# Patient Record
Sex: FEMALE | URBAN - METROPOLITAN AREA
[De-identification: names, ages, dates, MRNs, and addresses within clinical notes are randomized per-mention and may not be internally consistent; named-entity substitution may affect disease eponyms.]

---

## 2017-06-30 ENCOUNTER — HOSPITAL ENCOUNTER (EMERGENCY)
Facility: HOSPITAL | Age: 57
Discharge: HOME | End: 2017-06-30
Attending: EMERGENCY MEDICINE | Admitting: EMERGENCY MEDICINE
Payer: COMMERCIAL

## 2017-06-30 VITALS
TEMPERATURE: 97.6 F | BODY MASS INDEX: 28.17 KG/M2 | HEIGHT: 64 IN | RESPIRATION RATE: 18 BRPM | OXYGEN SATURATION: 96 % | SYSTOLIC BLOOD PRESSURE: 158 MMHG | WEIGHT: 165 LBS | HEART RATE: 69 BPM | DIASTOLIC BLOOD PRESSURE: 77 MMHG

## 2017-06-30 DIAGNOSIS — K92.1 HEMATOCHEZIA: Primary | ICD-10-CM

## 2017-06-30 PROCEDURE — 99284 EMERGENCY DEPT VISIT MOD MDM: CPT

## 2017-06-30 PROCEDURE — 82272 OCCULT BLD FECES 1-3 TESTS: CPT

## 2017-06-30 RX ORDER — METHYLPREDNISOLONE 4 MG
2-3 TABLET, DOSE PACK ORAL DAILY
COMMUNITY

## 2017-06-30 RX ORDER — LIOTHYRONINE SODIUM 5 UG/1
7.5 TABLET ORAL 2 TIMES DAILY
COMMUNITY

## 2017-06-30 RX ORDER — TRYPTOPHAN 500 MG
1-2 CAPSULE ORAL 3 TIMES DAILY
COMMUNITY

## 2023-09-14 ENCOUNTER — OFFICE VISIT (OUTPATIENT)
Dept: AUDIOLOGY | Facility: CLINIC | Age: 63
End: 2023-09-14
Payer: COMMERCIAL

## 2023-09-14 DIAGNOSIS — R42 DIZZINESS: Primary | ICD-10-CM

## 2023-09-14 PROCEDURE — 92537 CALORIC VSTBLR TEST W/REC: CPT | Performed by: AUDIOLOGIST

## 2023-09-14 PROCEDURE — 92540 BASIC VESTIBULAR EVALUATION: CPT | Performed by: AUDIOLOGIST

## 2023-10-02 ENCOUNTER — OFFICE VISIT (OUTPATIENT)
Dept: AUDIOLOGY | Facility: CLINIC | Age: 63
End: 2023-10-02
Payer: COMMERCIAL

## 2023-10-02 DIAGNOSIS — R42 DIZZINESS: Primary | ICD-10-CM

## 2023-10-02 PROCEDURE — 92519 VEMP TST I&R CERVICAL&OCULAR: CPT | Performed by: AUDIOLOGIST

## 2023-10-02 NOTE — PROGRESS NOTES
VESTIBULAR EVALUATION - [address] AUDIOLOGY    Patient Name: Claudia Quevedo   MRN:  [id number]   :  1960   Age: 66 y.o. Gender: female   DOS: 10/2/2023     HISTORY:     Claudia Quevedo, a 66 y.o. female, was seen on 10/2/2023 at the referral of Dr. Neris Fabian for a VNG study due to complaints of recurrent dizziness. Ms. Alice Pacheco was unaccompanied to today's visit. Ms. Alice Pacheco was last seen in our clinic on 23 for a vng study, which revealed normal findings. Today, Ms. Alice Pacheco reported that onset of symptoms are continued without relief. She continues to feel the brain fog and cognitive issues that were detailed at her last visit. Interim medical history was otherwise unremarkable. cVEMP RESULTS: Replicable Z1/B8 waveforms obtained at 70/100 dBnHL rarefaction using 500 Hz tone bursts at 5.1/sec. Right:   WNL; Absolute latencies WNL and P1/N1 waveforms absent at 70 dBnHL. Left: WNL; Absolute latencies WNL and P1/N1 waveforms absent at 70 dBnHL  Interamplitude Symmetry: Normal (32%, with a stronger left cVEMP response). Of note, this is just inside accepted clinical tolerance (33%)     Normal R/L ratio tolerance: 33%  Latency norms: P1 mean 16.9s, 2 SD 3.37      N1 mean 25.24s, 2 SD 4.07    cVEMP IMPRESSIONS:    • Normal bilateral cVEMP study, consistent with normal saccular functioning and vestibulo-collic reflex pathways. Of note, Ms. Alice Pahceco had considerable difficulty with the head raising procedure required in cVEMP testing due to limited cervical ROM and persistent tremors. This introduced some noise throughout recording. oVEMP RESULTS: Replicable C5/Y4 waveforms obtained at 70/100 dBnHL condensation using 500 Hz tone bursts at 5.1/sec. Right:   WNL; Absolute latencies WNL and P1/N1 waveforms absent at 70 dBnHL. Left: WNL;  Absolute latencies WNL and P1/N1 waveforms absent at 70 dBnHL  Interamplitude Symmetry: Normal (32%, with a stronger cVEMP response)    Normal R/L ratio tolerance: 33%  Latency norms: P1 mean 12.8s, 2 SD 2.47      N1 mean 15.9s, 2 SD 3.02    Poor SNR found for amplitudes AU, poorer re: right vs. Left. Interpretation with caution is advised. oVEMP IMPRESSIONS:    Normal bilateral oVEMP study, consistent with normal utricular functioning and crossed vestibulo-ocular reflex pathways. Normal (32%, with a stronger left cVEMP response). Of note, this is just inside accepted clinical tolerance (33%). RECOMMENDATIONS:  1.) Follow-up with referring provider to review today's results. Replication of borderline results are of concern for possible right asymmetry re: left. Taken together, vestibular therapy with a focus on central compensation and developing appropriate compensatory strategies may be considered. Findings altogether do not suggest an apparent otologic involvement or site of lesion. 2.) Fall precautions after vestibular system stimulation were discussed at length with the patient. Though most test effects are expected to subside within minutes, it was recommended that they allot extra time for ambulation, avoid rigorous activities, use handrails when available, and be cautious of poorly lit areas for the rest of the day. 3.) Continue to monitor dizziness symptoms. If symptoms worsen or fail to improve prior to follow-up with their referring provider, urgent medical attention should be considered. 4.) Ms. Joana Harp was reminded to be mindful of general wellness when considering dizziness etiologies, including: eating a healthy/balanced diet, proper hydration, stress reduction, proper sleep hygiene, maintenance of an exercise regimen, etc. They were encouraged to follow-up with their primary care physician for further guidance on these topics. *caloric results attached in media (if completed), as well as any other abnormal findings. *    Today's results were counseled at length with Ms. Joana Harp and all of her questions were addressed.      It was a pleasure working with Ms. Alice Pacheco today. Thank you for referring this patient. Benjamin Rdz.   Audiology Coordinator, Clinical Audiologist    [address] [phone]

## 2024-05-12 ENCOUNTER — HOSPITAL ENCOUNTER (EMERGENCY)
Facility: HOSPITAL | Age: 64
Discharge: HOME | End: 2024-05-12
Attending: EMERGENCY MEDICINE | Admitting: EMERGENCY MEDICINE
Payer: COMMERCIAL

## 2024-05-12 ENCOUNTER — APPOINTMENT (EMERGENCY)
Dept: RADIOLOGY | Facility: HOSPITAL | Age: 64
End: 2024-05-12
Payer: COMMERCIAL

## 2024-05-12 VITALS
SYSTOLIC BLOOD PRESSURE: 133 MMHG | OXYGEN SATURATION: 97 % | HEART RATE: 63 BPM | DIASTOLIC BLOOD PRESSURE: 69 MMHG | RESPIRATION RATE: 22 BRPM | TEMPERATURE: 97.2 F

## 2024-05-12 DIAGNOSIS — E87.1 HYPONATREMIA: ICD-10-CM

## 2024-05-12 DIAGNOSIS — R07.9 CHEST PAIN, UNSPECIFIED: Primary | ICD-10-CM

## 2024-05-12 LAB
2HR DELTA HS TROPONIN: 0 NG/L
ALBUMIN SERPL BCP-MCNC: 4.2 G/DL (ref 3.5–5)
ALP SERPL-CCNC: 57 U/L (ref 34–104)
ALT SERPL W P-5'-P-CCNC: 12 U/L (ref 7–52)
ANION GAP SERPL CALCULATED.3IONS-SCNC: 7 MMOL/L (ref 4–13)
AST SERPL W P-5'-P-CCNC: 17 U/L (ref 13–39)
BASOPHILS # BLD AUTO: 0.03 THOUSANDS/ÂΜL (ref 0–0.1)
BASOPHILS NFR BLD AUTO: 1 % (ref 0–1)
BILIRUB SERPL-MCNC: 0.57 MG/DL (ref 0.2–1)
BUN SERPL-MCNC: 12 MG/DL (ref 5–25)
CALCIUM SERPL-MCNC: 8.4 MG/DL (ref 8.4–10.2)
CARDIAC TROPONIN I PNL SERPL HS: 3 NG/L
CARDIAC TROPONIN I PNL SERPL HS: 3 NG/L
CHLORIDE SERPL-SCNC: 95 MMOL/L (ref 96–108)
CO2 SERPL-SCNC: 27 MMOL/L (ref 21–32)
CREAT SERPL-MCNC: 0.66 MG/DL (ref 0.6–1.3)
D DIMER PPP FEU-MCNC: <0.27 UG/ML FEU
EOSINOPHIL # BLD AUTO: 0.06 THOUSAND/ÂΜL (ref 0–0.61)
EOSINOPHIL NFR BLD AUTO: 1 % (ref 0–6)
ERYTHROCYTE [DISTWIDTH] IN BLOOD BY AUTOMATED COUNT: 13.4 % (ref 11.6–15.1)
GFR SERPL CREATININE-BSD FRML MDRD: 95 ML/MIN/1.73SQ M
GLUCOSE SERPL-MCNC: 97 MG/DL (ref 65–140)
HCT VFR BLD AUTO: 38.7 % (ref 34.8–46.1)
HGB BLD-MCNC: 13.3 G/DL (ref 11.5–15.4)
IMM GRANULOCYTES # BLD AUTO: 0.01 THOUSAND/UL (ref 0–0.2)
IMM GRANULOCYTES NFR BLD AUTO: 0 % (ref 0–2)
LYMPHOCYTES # BLD AUTO: 1.31 THOUSANDS/ÂΜL (ref 0.6–4.47)
LYMPHOCYTES NFR BLD AUTO: 24 % (ref 14–44)
MCH RBC QN AUTO: 29.5 PG (ref 26.8–34.3)
MCHC RBC AUTO-ENTMCNC: 34.4 G/DL (ref 31.4–37.4)
MCV RBC AUTO: 86 FL (ref 82–98)
MONOCYTES # BLD AUTO: 0.38 THOUSAND/ÂΜL (ref 0.17–1.22)
MONOCYTES NFR BLD AUTO: 7 % (ref 4–12)
NEUTROPHILS # BLD AUTO: 3.72 THOUSANDS/ÂΜL (ref 1.85–7.62)
NEUTS SEG NFR BLD AUTO: 67 % (ref 43–75)
NRBC BLD AUTO-RTO: 0 /100 WBCS
PLATELET # BLD AUTO: 269 THOUSANDS/UL (ref 149–390)
PMV BLD AUTO: 9 FL (ref 8.9–12.7)
POTASSIUM SERPL-SCNC: 3.6 MMOL/L (ref 3.5–5.3)
PROT SERPL-MCNC: 6.8 G/DL (ref 6.4–8.4)
RBC # BLD AUTO: 4.51 MILLION/UL (ref 3.81–5.12)
SODIUM SERPL-SCNC: 129 MMOL/L (ref 135–147)
WBC # BLD AUTO: 5.51 THOUSAND/UL (ref 4.31–10.16)

## 2024-05-12 PROCEDURE — 85025 COMPLETE CBC W/AUTO DIFF WBC: CPT | Performed by: PHYSICIAN ASSISTANT

## 2024-05-12 PROCEDURE — 85379 FIBRIN DEGRADATION QUANT: CPT | Performed by: PHYSICIAN ASSISTANT

## 2024-05-12 PROCEDURE — 99285 EMERGENCY DEPT VISIT HI MDM: CPT

## 2024-05-12 PROCEDURE — 96360 HYDRATION IV INFUSION INIT: CPT

## 2024-05-12 PROCEDURE — 93005 ELECTROCARDIOGRAM TRACING: CPT

## 2024-05-12 PROCEDURE — 99285 EMERGENCY DEPT VISIT HI MDM: CPT | Performed by: PHYSICIAN ASSISTANT

## 2024-05-12 PROCEDURE — 80053 COMPREHEN METABOLIC PANEL: CPT | Performed by: PHYSICIAN ASSISTANT

## 2024-05-12 PROCEDURE — 71045 X-RAY EXAM CHEST 1 VIEW: CPT

## 2024-05-12 PROCEDURE — 84484 ASSAY OF TROPONIN QUANT: CPT | Performed by: PHYSICIAN ASSISTANT

## 2024-05-12 PROCEDURE — 36415 COLL VENOUS BLD VENIPUNCTURE: CPT | Performed by: PHYSICIAN ASSISTANT

## 2024-05-12 RX ADMIN — SODIUM CHLORIDE 1000 ML: 0.9 INJECTION, SOLUTION INTRAVENOUS at 12:04

## 2024-05-12 NOTE — DISCHARGE INSTRUCTIONS
Follow up with your primary care provider - Ash Wall and your cardiologist out of [place] this week. Call tomorrow for appointment    Encourage fluids     Return to ED for worsening chest pain, shortness of breath, worsening symptoms

## 2024-05-12 NOTE — ED PROVIDER NOTES
"History  Chief Complaint   Patient presents with    Dizziness    Chest Pain     Pt reports of chest tightness, feels like \" my heart is racing \" since Friday     Patient is a 64 yo wf who reports 1 day history of sob, chest tightness, nausea, chills. No provocative or palliative factors. States increased stress recently regarding black mold in her residence. Denies smoking, illicit drug use, alcohol use. Started on pristiq 3 days ago but has already discontinued. No other complaints         Prior to Admission Medications   Prescriptions Last Dose Informant Patient Reported? Taking?   Cyanocobalamin (VITAMIN B 12 PO)   Yes No   Sig: Take 1 tablet by mouth daily   Glucosamine Sulfate 500 MG TABS   Yes No   Sig: Take 2-3 tablets by mouth daily   NON FORMULARY   Yes No   Sig: Take 1-2 capsules by mouth daily Pro epa   NON FORMULARY   Yes No   Sig: Take 1 tablet by mouth 2 (two) times a day coleus   Tryptophan 500 MG CAPS   Yes No   Sig: Take 1-2 capsules by mouth 3 (three) times a day   liothyronine (CYTOMEL) 5 mcg tablet   Yes No   Sig: Take 7.5 mcg by mouth 2 (two) times a day      Facility-Administered Medications: None       Past Medical History:   Diagnosis Date    Disease of thyroid gland     Niall's Temperature Syndrome    History of eye surgery        No past surgical history on file.    No family history on file.  I have reviewed and agree with the history as documented.    E-Cigarette/Vaping     E-Cigarette/Vaping Substances     Social History     Tobacco Use    Smoking status: Never   Substance Use Topics    Alcohol use: Yes    Drug use: No       Review of Systems   Constitutional:  Negative for fever.   Respiratory:  Positive for shortness of breath. Negative for cough.    Cardiovascular:  Negative for chest pain, palpitations and leg swelling.   Gastrointestinal:  Positive for nausea. Negative for abdominal pain and vomiting.   Genitourinary:  Negative for flank pain.   Neurological:  Negative for " headaches.       Physical Exam  Physical Exam  Vitals and nursing note reviewed.   Constitutional:       General: She is not in acute distress.     Appearance: She is well-developed. She is not ill-appearing, toxic-appearing or diaphoretic.   HENT:      Head: Normocephalic and atraumatic.   Eyes:      Extraocular Movements: Extraocular movements intact.      Pupils: Pupils are equal, round, and reactive to light.   Cardiovascular:      Rate and Rhythm: Normal rate and regular rhythm.      Heart sounds: Normal heart sounds.   Pulmonary:      Effort: Pulmonary effort is normal.      Breath sounds: Normal breath sounds.   Abdominal:      General: Bowel sounds are normal.      Palpations: Abdomen is soft.   Musculoskeletal:         General: Normal range of motion.      Cervical back: Normal range of motion and neck supple.   Skin:     General: Skin is warm and dry.      Capillary Refill: Capillary refill takes less than 2 seconds.   Neurological:      General: No focal deficit present.      Mental Status: She is alert and oriented to person, place, and time.   Psychiatric:         Mood and Affect: Mood normal.         Vital Signs  ED Triage Vitals [05/12/24 1040]   Temperature Pulse Respirations Blood Pressure SpO2   (!) 97.2 °F (36.2 °C) 72 20 129/73 97 %      Temp Source Heart Rate Source Patient Position - Orthostatic VS BP Location FiO2 (%)   Tympanic Monitor -- -- --      Pain Score       --           Vitals:    05/12/24 1130 05/12/24 1200 05/12/24 1230 05/12/24 1400   BP: 132/74 135/70 131/65 133/69   Pulse: 62 60 59 63         Visual Acuity      ED Medications  Medications   sodium chloride 0.9 % bolus 1,000 mL (0 mL Intravenous Stopped 5/12/24 1255)       Diagnostic Studies  Results Reviewed       Procedure Component Value Units Date/Time    HS Troponin I 2hr [[id number]]  (Normal) Collected: 05/12/24 1306    Lab Status: Final result Specimen: Blood from Arm, Right Updated: 05/12/24 1338     hs TnI 2hr 3 ng/L       Delta 2hr hsTnI 0 ng/L     HS Troponin I 4hr [[id number]]     Lab Status: No result Specimen: Blood     D-Dimer [[id number]]  (Normal) Collected: 05/12/24 1108    Lab Status: Final result Specimen: Blood from Arm, Left Updated: 05/12/24 1140     D-Dimer, Quant <0.27 ug/ml FEU     Narrative:      In the evaluation for possible pulmonary embolism, in the appropriate (Well's Score of 4 or less) patient, the age adjusted d-dimer cutoff for this patient can be calculated as:    Age x 0.01 (in ug/mL) for Age-adjusted D-dimer exclusion threshold for a patient over 50 years.    Comprehensive metabolic panel [[id number]]  (Abnormal) Collected: 05/12/24 1108    Lab Status: Final result Specimen: Blood from Arm, Left Updated: 05/12/24 1137     Sodium 129 mmol/L      Potassium 3.6 mmol/L      Chloride 95 mmol/L      CO2 27 mmol/L      ANION GAP 7 mmol/L      BUN 12 mg/dL      Creatinine 0.66 mg/dL      Glucose 97 mg/dL      Calcium 8.4 mg/dL      AST 17 U/L      ALT 12 U/L      Alkaline Phosphatase 57 U/L      Total Protein 6.8 g/dL      Albumin 4.2 g/dL      Total Bilirubin 0.57 mg/dL      eGFR 95 ml/min/1.73sq m     Narrative:      National Kid[employer] Disease Foundation guidelines for Chronic Kidney Disease (CKD):     Stage 1 with normal or high GFR (GFR > 90 mL/min/1.73 square meters)    Stage 2 Mild CKD (GFR = 60-89 mL/min/1.73 square meters)    Stage 3A Moderate CKD (GFR = 45-59 mL/min/1.73 square meters)    Stage 3B Moderate CKD (GFR = 30-44 mL/min/1.73 square meters)    Stage 4 Severe CKD (GFR = 15-29 mL/min/1.73 square meters)    Stage 5 End Stage CKD (GFR <15 mL/min/1.73 square meters)  Note: GFR calculation is accurate only with a steady state creatinine    HS Troponin 0hr (reflex protocol) [[id number]]  (Normal) Collected: 05/12/24 1108    Lab Status: Final result Specimen: Blood from Arm, Left Updated: 05/12/24 1137     hs TnI 0hr 3 ng/L     CBC and differential [[id number]] Collected: 05/12/24 110    Lab Status: Final result  Specimen: Blood from Arm, Left Updated: 05/12/24 1114     WBC 5.51 Thousand/uL      RBC 4.51 Million/uL      Hemoglobin 13.3 g/dL      Hematocrit 38.7 %      MCV 86 fL      MCH 29.5 pg      MCHC 34.4 g/dL      RDW 13.4 %      MPV 9.0 fL      Platelets 269 Thousands/uL      nRBC 0 /100 WBCs      Segmented % 67 %      Immature Grans % 0 %      Lymphocytes % 24 %      Monocytes % 7 %      Eosinophils Relative 1 %      Basophils Relative 1 %      Absolute Neutrophils 3.72 Thousands/µL      Absolute Immature Grans 0.01 Thousand/uL      Absolute Lymphocytes 1.31 Thousands/µL      Absolute Monocytes 0.38 Thousand/µL      Eosinophils Absolute 0.06 Thousand/µL      Basophils Absolute 0.03 Thousands/µL                    XR chest 1 view portable   Final Result by Soni Ramirez MD (05/12 1223)      No acute cardiopulmonary disease.            Workstation performed: [id number]                    Procedures  ECG 12 Lead Documentation Only    Date/Time: 5/12/2024 4:05 PM    Performed by: Alfonso Li PA-C  Authorized by: Alfonso Li PA-C    Indications / Diagnosis:  Chest tightness  ECG reviewed by me, the ED Provider: yes    Patient location:  ED  Rate:     ECG rate:  69    ECG rate assessment: normal    Rhythm:     Rhythm: sinus rhythm    Ectopy:     Ectopy: none    QRS:     QRS axis:  Normal    QRS intervals:  Normal  Conduction:     Conduction: normal    ST segments:     ST segments:  Normal  T waves:     T waves: normal             ED Course             HEART Risk Score      Flowsheet Row Most Recent Value   Heart Score Risk Calculator    History 0 Filed at: 05/12/2024 1419   ECG 0 Filed at: 05/12/2024 1419   Age 1 Filed at: 05/12/2024 1419   Risk Factors 1 Filed at: 05/12/2024 1419   Troponin 0 Filed at: 05/12/2024 1419   HEART Score 2 Filed at: 05/12/2024 1419                                        Medical Decision Making  I have considered ACS, arrhythmia, PE, pneumonia, pneumothorax on my  differential diagnosis.  Patient's pulse ox is 97% on room air indicating adequate oxygenation.  I ordered a chest x-ray.  I independently interpreted as no acute cardiopulmonary abnormality.  EKG was normal sinus rhythm without acute ischemic change.  Labs reviewed.  Patient with hyponatremia with sodium 129.  Was given 1 L normal saline.  No current chest pain in the ER.  Heart score 2.  Advised to follow-up with her primary care provider (Ash Wall) and cardiologist out of [place] this week by calling tomorrow for appointment.  Return precautions reviewed.    Amount and/or Complexity of Data Reviewed  Labs: ordered.  Radiology: ordered.             Disposition  Final diagnoses:   Chest pain, unspecified   Hyponatremia     Time reflects when diagnosis was documented in both MDM as applicable and the Disposition within this note       Time User Action Codes Description Comment    5/12/2024  2:21 PM Alfonso Li [R07.9] Chest pain, unspecified     5/12/2024  2:24 PM Alfonso Li [E87.1] Hyponatremia           ED Disposition       ED Disposition   Discharge    Condition   Stable    Date/Time   Sun May 12, 2024 1421    Comment   Casi Roberts discharge to home/self care.                   Follow-up Information       Follow up With Specialties Details Why Contact Info    Ash Wall [facility]   [address] [place] [address]  [phone]              Discharge Medication List as of 5/12/2024  2:24 PM        CONTINUE these medications which have NOT CHANGED    Details   Cyanocobalamin (VITAMIN B 12 PO) Take 1 tablet by mouth daily, Historical Med      Glucosamine Sulfate 500 MG TABS Take 2-3 tablets by mouth daily, Historical Med      liothyronine (CYTOMEL) 5 mcg tablet Take 7.5 mcg by mouth 2 (two) times a day, Historical Med      !! NON FORMULARY Take 1-2 capsules by mouth daily Pro epa, Historical Med      !! NON FORMULARY Take 1 tablet by mouth 2 (two) times a  day darrellus, Historical Med      Tryptophan 500 MG CAPS Take 1-2 capsules by mouth 3 (three) times a day, Historical Med       !! - Potential duplicate medications found. Please discuss with provider.          No discharge procedures on file.    PDMP Review       None            ED Provider  Electronically Signed by             Alfonso Li PA-C  05/12/24 7701

## 2024-05-13 LAB
ATRIAL RATE: 69 BPM
P AXIS: 69 DEGREES
PR INTERVAL: 162 MS
QRS AXIS: 71 DEGREES
QRSD INTERVAL: 82 MS
QT INTERVAL: 386 MS
QTC INTERVAL: 413 MS
T WAVE AXIS: 64 DEGREES
VENTRICULAR RATE: 69 BPM

## 2024-05-13 PROCEDURE — 93010 ELECTROCARDIOGRAM REPORT: CPT | Performed by: INTERNAL MEDICINE

## 2024-06-21 ENCOUNTER — APPOINTMENT (EMERGENCY)
Dept: RADIOLOGY | Facility: HOSPITAL | Age: 64
End: 2024-06-21
Payer: COMMERCIAL

## 2024-06-21 ENCOUNTER — HOSPITAL ENCOUNTER (EMERGENCY)
Facility: HOSPITAL | Age: 64
Discharge: HOME | End: 2024-06-22
Attending: EMERGENCY MEDICINE
Payer: COMMERCIAL

## 2024-06-21 DIAGNOSIS — R07.9 CHEST PAIN: Primary | ICD-10-CM

## 2024-06-21 LAB
ALBUMIN SERPL BCG-MCNC: 4.1 G/DL (ref 3.5–5)
ALP SERPL-CCNC: 61 U/L (ref 34–104)
ALT SERPL W P-5'-P-CCNC: 17 U/L (ref 7–52)
ANION GAP SERPL CALCULATED.3IONS-SCNC: 6 MMOL/L (ref 4–13)
AST SERPL W P-5'-P-CCNC: 20 U/L (ref 13–39)
BASOPHILS # BLD AUTO: 0.04 THOUSANDS/ÂΜL (ref 0–0.1)
BASOPHILS NFR BLD AUTO: 1 % (ref 0–1)
BILIRUB SERPL-MCNC: 0.38 MG/DL (ref 0.2–1)
BUN SERPL-MCNC: 19 MG/DL (ref 5–25)
CALCIUM SERPL-MCNC: 8.1 MG/DL (ref 8.4–10.2)
CARDIAC TROPONIN I PNL SERPL HS: 3 NG/L
CARDIAC TROPONIN I PNL SERPL HS: 4 NG/L (ref 8–18)
CHLORIDE SERPL-SCNC: 97 MMOL/L (ref 96–108)
CO2 SERPL-SCNC: 26 MMOL/L (ref 21–32)
CREAT SERPL-MCNC: 0.59 MG/DL (ref 0.6–1.3)
EOSINOPHIL # BLD AUTO: 0.08 THOUSAND/ÂΜL (ref 0–0.61)
EOSINOPHIL NFR BLD AUTO: 1 % (ref 0–6)
ERYTHROCYTE [DISTWIDTH] IN BLOOD BY AUTOMATED COUNT: 14.1 % (ref 11.6–15.1)
GFR SERPL CREATININE-BSD FRML MDRD: 99 ML/MIN/1.73SQ M
GLUCOSE SERPL-MCNC: 113 MG/DL (ref 65–140)
HCT VFR BLD AUTO: 35.8 % (ref 34.8–46.1)
HGB BLD-MCNC: 12.1 G/DL (ref 11.5–15.4)
IMM GRANULOCYTES # BLD AUTO: 0.01 THOUSAND/UL (ref 0–0.2)
IMM GRANULOCYTES NFR BLD AUTO: 0 % (ref 0–2)
LYMPHOCYTES # BLD AUTO: 2.29 THOUSANDS/ÂΜL (ref 0.6–4.47)
LYMPHOCYTES NFR BLD AUTO: 37 % (ref 14–44)
MCH RBC QN AUTO: 29.7 PG (ref 26.8–34.3)
MCHC RBC AUTO-ENTMCNC: 33.8 G/DL (ref 31.4–37.4)
MCV RBC AUTO: 88 FL (ref 82–98)
MONOCYTES # BLD AUTO: 0.43 THOUSAND/ÂΜL (ref 0.17–1.22)
MONOCYTES NFR BLD AUTO: 7 % (ref 4–12)
NEUTROPHILS # BLD AUTO: 3.27 THOUSANDS/ÂΜL (ref 1.85–7.62)
NEUTS SEG NFR BLD AUTO: 54 % (ref 43–75)
NRBC BLD AUTO-RTO: 0 /100 WBCS
PLATELET # BLD AUTO: 257 THOUSANDS/UL (ref 149–390)
PMV BLD AUTO: 9.2 FL (ref 8.9–12.7)
POTASSIUM SERPL-SCNC: 3.5 MMOL/L (ref 3.5–5.3)
PROT SERPL-MCNC: 6.5 G/DL (ref 6.4–8.4)
RBC # BLD AUTO: 4.08 MILLION/UL (ref 3.81–5.12)
SODIUM SERPL-SCNC: 129 MMOL/L (ref 135–147)
WBC # BLD AUTO: 6.12 THOUSAND/UL (ref 4.31–10.16)

## 2024-06-21 PROCEDURE — 74174 CTA ABD&PLVS W/CONTRAST: CPT

## 2024-06-21 PROCEDURE — 84484 ASSAY OF TROPONIN QUANT: CPT | Performed by: EMERGENCY MEDICINE

## 2024-06-21 PROCEDURE — 36415 COLL VENOUS BLD VENIPUNCTURE: CPT | Performed by: EMERGENCY MEDICINE

## 2024-06-21 PROCEDURE — 80053 COMPREHEN METABOLIC PANEL: CPT | Performed by: EMERGENCY MEDICINE

## 2024-06-21 PROCEDURE — 85025 COMPLETE CBC W/AUTO DIFF WBC: CPT | Performed by: EMERGENCY MEDICINE

## 2024-06-21 PROCEDURE — 93005 ELECTROCARDIOGRAM TRACING: CPT

## 2024-06-21 PROCEDURE — 71275 CT ANGIOGRAPHY CHEST: CPT

## 2024-06-21 PROCEDURE — 99285 EMERGENCY DEPT VISIT HI MDM: CPT

## 2024-06-21 PROCEDURE — 99285 EMERGENCY DEPT VISIT HI MDM: CPT | Performed by: EMERGENCY MEDICINE

## 2024-06-21 RX ADMIN — IOHEXOL 100 ML: 350 INJECTION, SOLUTION INTRAVENOUS at 21:16

## 2024-06-22 VITALS
SYSTOLIC BLOOD PRESSURE: 148 MMHG | HEART RATE: 64 BPM | RESPIRATION RATE: 16 BRPM | TEMPERATURE: 96 F | OXYGEN SATURATION: 97 % | DIASTOLIC BLOOD PRESSURE: 89 MMHG

## 2024-06-22 NOTE — DISCHARGE INSTRUCTIONS
Follow-up with primary care and cardiology for further care. Contact info provided below if needed.  Use over the counter medications as stated on the bottle as needed for pain control.  Continue your home medications as prescribed.   Return to the ED with new or worsening symptoms.

## 2024-06-22 NOTE — ED PROVIDER NOTES
History  Chief Complaint   Patient presents with    Chest Pain     Pt with right arm pain, chest tightness and jaw pain starting 90 minutes PTA, took 325mg aspirin and 2mg diazepam PTA.      Pt is a 62yo F who presents for chest pain. Pt reports that approximately an hour prior to arrival, she began with atraumatic R arm pain that developed into chest pain and 'numbness' in her extremities. Pt states she has a history of chest pain and has been evaluated here previously for chest pain but this time is different. Pt states that she overall feels unwell and therefore came in for further evaluation. Pt reports that she did take ASA prior to arrival as well as Valium. Pt denies any trauma or inciting incident.         Prior to Admission Medications   Prescriptions Last Dose Informant Patient Reported? Taking?   Cyanocobalamin (VITAMIN B 12 PO)   Yes No   Sig: Take 1 tablet by mouth daily   Glucosamine Sulfate 500 MG TABS   Yes No   Sig: Take 2-3 tablets by mouth daily   NON FORMULARY   Yes No   Sig: Take 1-2 capsules by mouth daily Pro epa   NON FORMULARY   Yes No   Sig: Take 1 tablet by mouth 2 (two) times a day coleus   Tryptophan 500 MG CAPS   Yes No   Sig: Take 1-2 capsules by mouth 3 (three) times a day   liothyronine (CYTOMEL) 5 mcg tablet   Yes No   Sig: Take 7.5 mcg by mouth 2 (two) times a day      Facility-Administered Medications: None       Past Medical History:   Diagnosis Date    Disease of thyroid gland     Niall's Temperature Syndrome    History of eye surgery     Nonobstructive atherosclerosis of coronary artery        History reviewed. No pertinent surgical history.    History reviewed. No pertinent family history.  I have reviewed and agree with the history as documented.    E-Cigarette/Vaping     E-Cigarette/Vaping Substances     Social History     Tobacco Use    Smoking status: Never   Substance Use Topics    Alcohol use: Yes    Drug use: No       Review of Systems   Cardiovascular:  Positive for  chest pain.   Neurological:  Positive for numbness.   All other systems reviewed and are negative.      Physical Exam  Physical Exam  Vitals reviewed.   Constitutional:       General: She is not in acute distress.     Appearance: She is well-developed. She is not toxic-appearing or diaphoretic.   HENT:      Head: Normocephalic and atraumatic.      Right Ear: External ear normal.      Left Ear: External ear normal.      Nose: Nose normal.      Mouth/Throat:      Pharynx: Oropharynx is clear.   Eyes:      Extraocular Movements: Extraocular movements intact.      Pupils: Pupils are equal, round, and reactive to light.   Cardiovascular:      Rate and Rhythm: Normal rate and regular rhythm.      Heart sounds: Normal heart sounds. No murmur heard.  Pulmonary:      Effort: Pulmonary effort is normal. No respiratory distress.      Breath sounds: Normal breath sounds. No wheezing.   Abdominal:      General: There is no distension.      Palpations: Abdomen is soft.      Tenderness: There is no abdominal tenderness.   Musculoskeletal:         General: Normal range of motion.      Cervical back: Normal range of motion and neck supple.      Right lower leg: No edema.      Left lower leg: No edema.   Skin:     General: Skin is warm and dry.      Capillary Refill: Capillary refill takes less than 2 seconds.   Neurological:      Mental Status: She is alert and oriented to person, place, and time.      Cranial Nerves: No cranial nerve deficit.      Sensory: No sensory deficit.      Motor: No weakness.      Coordination: Coordination normal.      Gait: Gait normal.      Comments: Slight decrease sensation on R side when compared to L on upper and lower extremities   Psychiatric:         Mood and Affect: Mood is anxious.         Speech: Speech normal.         Behavior: Behavior is cooperative.         Vital Signs  ED Triage Vitals   Temperature Pulse Respirations Blood Pressure SpO2   06/21/24 2042 06/21/24 2035 06/21/24 2035 06/21/24  2035 06/21/24 2035   (!) 96 °F (35.6 °C) 62 16 157/88 98 %      Temp Source Heart Rate Source Patient Position - Orthostatic VS BP Location FiO2 (%)   06/21/24 2042 06/21/24 2035 06/21/24 2035 06/21/24 2035 --   Tympanic Monitor Sitting Left arm       Pain Score       --                  Vitals:    06/21/24 2035   BP: 157/88   Pulse: 62   Patient Position - Orthostatic VS: Sitting         Visual Acuity      ED Medications  Medications   iohexol (OMNIPAQUE) 350 MG/ML injection (MULTI-DOSE) 100 mL (100 mL Intravenous Given 6/21/24 2116)       Diagnostic Studies  Results Reviewed       Procedure Component Value Units Date/Time    High Sensitivity Troponin I Random [[id number]] Collected: 06/21/24 2314    Lab Status: No result Specimen: Blood from Arm, Right     HS Troponin 0hr (reflex protocol) [[id number]]  (Normal) Collected: 06/21/24 2043    Lab Status: Final result Specimen: Blood from Arm, Right Updated: 06/21/24 2114     hs TnI 0hr 3 ng/L     Comprehensive metabolic panel [[id number]]  (Abnormal) Collected: 06/21/24 2043    Lab Status: Final result Specimen: Blood from Arm, Right Updated: 06/21/24 2106     Sodium 129 mmol/L      Potassium 3.5 mmol/L      Chloride 97 mmol/L      CO2 26 mmol/L      ANION GAP 6 mmol/L      BUN 19 mg/dL      Creatinine 0.59 mg/dL      Glucose 113 mg/dL      Calcium 8.1 mg/dL      AST 20 U/L      ALT 17 U/L      Alkaline Phosphatase 61 U/L      Total Protein 6.5 g/dL      Albumin 4.1 g/dL      Total Bilirubin 0.38 mg/dL      eGFR 99 ml/min/1.73sq m     Narrative:      National Kidney Disease Foundation guidelines for Chronic Kidney Disease (CKD):     Stage 1 with normal or high GFR (GFR > 90 mL/min/1.73 square meters)    Stage 2 Mild CKD (GFR = 60-89 mL/min/1.73 square meters)    Stage 3A Moderate CKD (GFR = 45-59 mL/min/1.73 square meters)    Stage 3B Moderate CKD (GFR = 30-44 mL/min/1.73 square meters)    Stage 4 Severe CKD (GFR = 15-29 mL/min/1.73 square meters)    Stage 5 End Stage  CKD (GFR <15 mL/min/1.73 square meters)  Note: GFR calculation is accurate only with a steady state creatinine    CBC and differential [[id number]] Collected: 06/21/24 2043    Lab Status: Final result Specimen: Blood from Arm, Right Updated: 06/21/24 2051     WBC 6.12 Thousand/uL      RBC 4.08 Million/uL      Hemoglobin 12.1 g/dL      Hematocrit 35.8 %      MCV 88 fL      MCH 29.7 pg      MCHC 33.8 g/dL      RDW 14.1 %      MPV 9.2 fL      Platelets 257 Thousands/uL      nRBC 0 /100 WBCs      Segmented % 54 %      Immature Grans % 0 %      Lymphocytes % 37 %      Monocytes % 7 %      Eosinophils Relative 1 %      Basophils Relative 1 %      Absolute Neutrophils 3.27 Thousands/µL      Absolute Immature Grans 0.01 Thousand/uL      Absolute Lymphocytes 2.29 Thousands/µL      Absolute Monocytes 0.43 Thousand/µL      Eosinophils Absolute 0.08 Thousand/µL      Basophils Absolute 0.04 Thousands/µL                    CTA dissection protocol chest/abdomen/pelvis   Final Result by Venkat Thurman MD (06/21 2304)      No evidence of acute aortic pathology. No aortic aneurysm or significant atherosclerotic disease.      No consolidation or effusion.   Additional findings as above.         Workstation performed: [id number]                    Procedures  Procedures         ED Course  ED Course as of 06/21/24 2316 Fri Jun 21, 2024 2042 Procedure Note: EKG  Date/Time: 06/21/24 8:42 PM   Interpreted by: Val Olvera MD  Indications / Diagnosis: CP  ECG reviewed by me, the ED Physician: yes   The EKG demonstrates:  Rhythm: normal sinus  Intervals: normal intervals  Axis: normal axis  QRS/Blocks: normal QRS  ST Changes: No acute ST Changes, no STD/BRYAN.   2057 CBC and differential  Reviewed and without actionable derangement.    2107 Sodium(!): 129  Hyponatremia stable from prior.    2107 Comprehensive metabolic panel(!)  Reviewed and without actionable derangement.    2115 hs TnI 0hr: 3  WNL. Will require delta based on timing.     2253 Pt reassessed and resting comfortably. Pt made aware of results thus far and that we are awaiting delta trop and CT read prior to dispo. Pt expressed understanding.    2309 CTA dissection protocol chest/abdomen/pelvis  No evidence of acute aortic pathology. No aortic aneurysm or significant atherosclerotic disease.  No consolidation or effusion.             HEART Risk Score      Flowsheet Row Most Recent Value   Heart Score Risk Calculator    History 1 Filed at: 06/21/2024 2256   ECG 0 Filed at: 06/21/2024 2256   Age 1 Filed at: 06/21/2024 2256   Risk Factors 2 Filed at: 06/21/2024 2256   Troponin 0 Filed at: 06/21/2024 2256   HEART Score 4 Filed at: 06/21/2024 2256                                        Medical Decision Making  Pt is a 62yo F who presents with chest pain.     Differential diagnosis to include but not limited to ACS, dissection, electrolyte abnormality, dehydration.  Will plan for labs and imaging. As pt reporting chest pain and numbness, will obtain dissection scan. Pt offered pain medication but declined. See ED course for results and details.    Still awaiting delta trop at time of sign out. Pt signed out to oncoming physician with plan for DC if negative.        Amount and/or Complexity of Data Reviewed  Labs: ordered. Decision-making details documented in ED Course.  Radiology: ordered. Decision-making details documented in ED Course.    Risk  Prescription drug management.             Disposition  Final diagnoses:   Chest pain     Time reflects when diagnosis was documented in both MDM as applicable and the Disposition within this note       Time User Action Codes Description Comment    6/21/2024 10:56 PM Val Olvera Add [R07.9] Chest pain           ED Disposition       None          Follow-up Information       Follow up With Specialties Details Why Contact Info Additional Information    [employer]  Call  As needed [phone]       [facility]  Call on 6/24/2024  [address]  [facility] [address] [phone] [facility] [place], [address], [place], [place], [address] [phone]            Patient's Medications   Discharge Prescriptions    No medications on file       No discharge procedures on file.    PDMP Review       None            ED Provider  Electronically Signed by             Val Olvera MD  06/21/24 8808

## 2024-06-24 LAB
ATRIAL RATE: 63 BPM
P AXIS: 73 DEGREES
PR INTERVAL: 158 MS
QRS AXIS: 73 DEGREES
QRSD INTERVAL: 82 MS
QT INTERVAL: 398 MS
QTC INTERVAL: 407 MS
T WAVE AXIS: 71 DEGREES
VENTRICULAR RATE: 63 BPM

## 2024-06-24 PROCEDURE — 93010 ELECTROCARDIOGRAM REPORT: CPT | Performed by: INTERNAL MEDICINE

## 2024-07-24 ENCOUNTER — OFFICE VISIT (OUTPATIENT)
Dept: URGENT CARE | Facility: CLINIC | Age: 64
End: 2024-07-24
Payer: COMMERCIAL

## 2024-07-24 VITALS
BODY MASS INDEX: 20.51 KG/M2 | HEART RATE: 62 BPM | RESPIRATION RATE: 18 BRPM | SYSTOLIC BLOOD PRESSURE: 151 MMHG | TEMPERATURE: 98.3 F | HEIGHT: 64 IN | WEIGHT: 120.13 LBS | DIASTOLIC BLOOD PRESSURE: 79 MMHG | OXYGEN SATURATION: 98 %

## 2024-07-24 DIAGNOSIS — J06.9 UPPER RESPIRATORY TRACT INFECTION, UNSPECIFIED TYPE: Primary | ICD-10-CM

## 2024-07-24 DIAGNOSIS — R52 GENERALIZED BODY ACHES: ICD-10-CM

## 2024-07-24 DIAGNOSIS — R30.0 DYSURIA: ICD-10-CM

## 2024-07-24 PROBLEM — K21.9 GASTROESOPHAGEAL REFLUX DISEASE WITHOUT ESOPHAGITIS: Status: ACTIVE | Noted: 2022-10-10

## 2024-07-24 PROBLEM — R07.9 CHEST PAIN: Status: ACTIVE | Noted: 2019-07-02

## 2024-07-24 PROBLEM — Z98.890 POST-OPERATIVE NAUSEA AND VOMITING: Status: ACTIVE | Noted: 2022-10-10

## 2024-07-24 PROBLEM — Z84.89 FAMILY HISTORY OF SUDDEN DEATH: Status: ACTIVE | Noted: 2022-10-10

## 2024-07-24 PROBLEM — E78.5 HYPERLIPIDEMIA: Status: ACTIVE | Noted: 2022-10-10

## 2024-07-24 PROBLEM — G93.32 CHRONIC FATIGUE SYNDROME: Status: ACTIVE | Noted: 2022-10-10

## 2024-07-24 PROBLEM — R03.0 ELEVATED BLOOD PRESSURE READING WITHOUT DIAGNOSIS OF HYPERTENSION: Status: ACTIVE | Noted: 2022-10-10

## 2024-07-24 PROBLEM — S82.61XD: Status: ACTIVE | Noted: 2019-02-08

## 2024-07-24 PROBLEM — K11.8 MASS OF PAROTID GLAND: Status: ACTIVE | Noted: 2022-06-28

## 2024-07-24 PROBLEM — S82.041D: Status: ACTIVE | Noted: 2019-02-08

## 2024-07-24 PROBLEM — R11.2 POST-OPERATIVE NAUSEA AND VOMITING: Status: ACTIVE | Noted: 2022-10-10

## 2024-07-24 PROBLEM — I25.10 ATHEROSCLEROTIC HEART DISEASE OF NATIVE CORONARY ARTERY WITHOUT ANGINA PECTORIS: Status: ACTIVE | Noted: 2022-10-10

## 2024-07-24 LAB
S PYO AG THROAT QL: NEGATIVE
SL AMB  POCT GLUCOSE, UA: NEGATIVE
SL AMB LEUKOCYTE ESTERASE,UA: NEGATIVE
SL AMB POCT BILIRUBIN,UA: NEGATIVE
SL AMB POCT BLOOD,UA: ABNORMAL
SL AMB POCT CLARITY,UA: CLEAR
SL AMB POCT COLOR,UA: YELLOW
SL AMB POCT KETONES,UA: NEGATIVE
SL AMB POCT NITRITE,UA: NEGATIVE
SL AMB POCT PH,UA: 6
SL AMB POCT SPECIFIC GRAVITY,UA: 1.02
SL AMB POCT URINE PROTEIN: ABNORMAL
SL AMB POCT UROBILINOGEN: 0.2

## 2024-07-24 PROCEDURE — 87636 SARSCOV2 & INF A&B AMP PRB: CPT

## 2024-07-24 PROCEDURE — 87880 STREP A ASSAY W/OPTIC: CPT

## 2024-07-24 PROCEDURE — 99213 OFFICE O/P EST LOW 20 MIN: CPT

## 2024-07-24 PROCEDURE — 87086 URINE CULTURE/COLONY COUNT: CPT

## 2024-07-24 PROCEDURE — 81002 URINALYSIS NONAUTO W/O SCOPE: CPT

## 2024-07-24 RX ORDER — MIRTAZAPINE 7.5 MG/1
TABLET, FILM COATED ORAL
COMMUNITY
Start: 2024-06-08

## 2024-07-24 RX ORDER — HYDROXYZINE HYDROCHLORIDE 25 MG/1
25 TABLET, FILM COATED ORAL 2 TIMES DAILY
COMMUNITY
Start: 2024-06-03

## 2024-07-24 RX ORDER — NICOTINE 14MG/24HR
PATCH, TRANSDERMAL 24 HOURS TRANSDERMAL
COMMUNITY

## 2024-07-24 RX ORDER — OMEGA-3S/DHA/EPA/FISH OIL/D3 300MG-1000
400 CAPSULE ORAL
COMMUNITY

## 2024-07-24 RX ORDER — ESTRADIOL 0.1 MG/G
CREAM VAGINAL
COMMUNITY

## 2024-07-24 RX ORDER — DULOXETIN HYDROCHLORIDE 20 MG/1
30 CAPSULE, DELAYED RELEASE ORAL DAILY
COMMUNITY
Start: 2024-06-17

## 2024-07-24 RX ORDER — DESVENLAFAXINE 25 MG/1
25 TABLET, EXTENDED RELEASE ORAL DAILY
COMMUNITY
Start: 2024-05-08

## 2024-07-24 RX ORDER — AMANTADINE HYDROCHLORIDE 100 MG/1
1 CAPSULE, GELATIN COATED ORAL EVERY 12 HOURS
COMMUNITY

## 2024-07-24 RX ORDER — CITALOPRAM 20 MG/1
TABLET ORAL EVERY 24 HOURS
COMMUNITY

## 2024-07-24 RX ORDER — DIAZEPAM 2 MG/1
2 TABLET ORAL DAILY PRN
COMMUNITY
Start: 2024-06-05

## 2024-07-24 NOTE — PATIENT INSTRUCTIONS
Continue medications as prescribed for interstitial cystitis. Will follow-up with urine culture results if need to start antibiotic. Symptoms of a viral infection may linger for up to 10 days. Your contagious period is the first 5-7 days of symptom onset. Continue over-the-counter products for symptoms: tylenol for fevers, ibuprofen for body aches, flonase (fluticasone) with nasal saline for congestion, robitussin/coricidin for cough, and airborne/emergen-c for vitamin supplementation. Follow-up with PCP in 3-5 days if no improvement of symptoms. Report to ER sooner if symptoms worsen.

## 2024-07-24 NOTE — PROGRESS NOTES
[facility]        NAME: Casi Roberts is a 63 y.o. female  : 1960    MRN: [id number]  DATE: 2024  TIME: 1:06 PM    Assessment and Plan   Upper respiratory tract infection, unspecified type [J06.9]  1. Upper respiratory tract infection, unspecified type        2. Dysuria  POCT urine dip    Urine culture    Urine culture      3. Generalized body aches  Covid/Flu- Office Collect Normal    POCT rapid ANTIGEN strepA    Covid/Flu- Office Collect Normal        Rapid Strep negative. POC urine reveals trace protein and trace blood only, will send for culture and f/u if need to start antibiotic. COVID/flu PCR sent to lab, will f/u if positive. Suspect viral illness given clinical presentation. VSS in clinic, appears in no acute distress. Educated on use of OTC products for symptoms. Advised close follow-up with PCP or to report to the ER if symptoms worsen. Patient verbalizes understanding and agreeable to plan.       Patient Instructions     Continue medications as prescribed for interstitial cystitis. Will follow-up with urine culture results if need to start antibiotic. Symptoms of a viral infection may linger for up to 10 days. Your contagious period is the first 5-7 days of symptom onset. Continue over-the-counter products for symptoms: tylenol for fevers, ibuprofen for body aches, flonase (fluticasone) with nasal saline for congestion, robitussin/coricidin for cough, and airborne/emergen-c for vitamin supplementation. Follow-up with PCP in 3-5 days if no improvement of symptoms. Report to ER sooner if symptoms worsen.         Chief Complaint     Chief Complaint   Patient presents with    Possible UTI     Pain in lower back, frequent urination, chills and body aches         History of Present Illness       63 year old female presents for evaluation of urinary frequency, urgency, and back pain for the past 2 days. She also reports congestion, body aches, and chills. She does have a h/o  interstitial cystitis for which she takes hydroxyzine but also [place] this past weekend in [facility]. She also reports working around dust last week. She reports a mild sore throat and lymph node swelling but denies cough or SOB. She denies any known fevers, urinary odors/discharges, or difficulty passing urine. She reports mild LLQ abdominal pain but denies NVD. She reports h/o frequent UTI's secondary to interstitial cystitis but was unsure if her symptoms were related to a URI or UTI. She took a home COVID test that was negative. She's been taking hydroxyzine as prescribed and vitamin supplements for immune boost with minimal improvement.     Urinary Tract Infection   This is a new problem. The current episode started in the past 7 days. The problem occurs every urination. The problem has been unchanged. The quality of the pain is described as aching and burning. There has been no fever. She is Not sexually active. Associated symptoms include chills and frequency. Pertinent negatives include no discharge, flank pain, hematuria, hesitancy, nausea, possible pregnancy, sweats, urgency or vomiting. She has tried increased fluids and home medications for the symptoms. The treatment provided no relief. Her past medical history is significant for recurrent UTIs and urinary stasis. There is no history of catheterization, kidney stones, a single kidney or a urological procedure.   URI   This is a new problem. The current episode started in the past 7 days. The problem has been unchanged. There has been no fever. Associated symptoms include abdominal pain, congestion, dysuria, headaches, joint pain, a plugged ear sensation, rhinorrhea, a sore throat and swollen glands. Pertinent negatives include no chest pain, coughing, diarrhea, ear pain, joint swelling, nausea, neck pain, rash, sinus pain, sneezing, vomiting or wheezing. She has tried increased fluids (Vitamin supplements) for the symptoms. The treatment  provided mild relief.       Review of Systems   Review of Systems   Constitutional:  Positive for activity change, chills and fatigue. Negative for appetite change and fever.   HENT:  Positive for congestion, postnasal drip, rhinorrhea and sore throat. Negative for ear discharge, ear pain, sinus pressure, sinus pain, sneezing and trouble swallowing.    Eyes:  Negative for visual disturbance.   Respiratory:  Negative for cough, chest tightness, shortness of breath and wheezing.    Cardiovascular:  Negative for chest pain.   Gastrointestinal:  Positive for abdominal pain. Negative for constipation, diarrhea, nausea and vomiting.   Genitourinary:  Positive for dysuria, frequency and pelvic pain. Negative for decreased urine volume, difficulty urinating, flank pain, hematuria, hesitancy, urgency, vaginal bleeding, vaginal discharge and vaginal pain.   Musculoskeletal:  Positive for back pain, joint pain and myalgias. Negative for arthralgias and neck pain.   Skin:  Negative for color change, pallor, rash and wound.   Allergic/Immunologic: Negative for environmental allergies and food allergies.   Neurological:  Positive for headaches. Negative for dizziness and light-headedness.         Current Medications       Current Outpatient Medications:     diazepam (VALIUM) 2 mg tablet, Take 2 mg by mouth daily as needed, Disp: , Rfl:     DULoxetine (CYMBALTA) 20 mg capsule, Take 30 mg by mouth daily, Disp: , Rfl:     estradiol (ESTRACE) 0.1 mg/g vaginal cream, Insert into the vagina, Disp: , Rfl:     hydrOXYzine HCL (ATARAX) 25 mg tablet, Take 25 mg by mouth 2 (two) times a day, Disp: , Rfl:     mirtazapine (REMERON) 7.5 MG tablet, take 1 tablet by oral route every day at bedtime as needed, Disp: , Rfl:     amantadine (SYMMETREL) 100 mg capsule, 1 capsule Every 12 hours (Patient not taking: Reported on 7/24/2024), Disp: , Rfl:     cholecalciferol (VITAMIN D3) 400 units tablet, Take 400 Units by mouth (Patient not taking:  Reported on 7/24/2024), Disp: , Rfl:     citalopram (CeleXA) 20 mg tablet, every 24 hours (Patient not taking: Reported on 7/24/2024), Disp: , Rfl:     Cyanocobalamin (VITAMIN B 12 PO), Take 1 tablet by mouth daily (Patient not taking: Reported on 7/24/2024), Disp: , Rfl:     Desvenlafaxine Succinate ER 25 MG TB24, Take 25 mg by mouth daily (Patient not taking: Reported on 7/24/2024), Disp: , Rfl:     Glucosamine Sulfate 500 MG TABS, Take 2-3 tablets by mouth daily (Patient not taking: Reported on 7/24/2024), Disp: , Rfl:     liothyronine (CYTOMEL) 5 mcg tablet, Take 7.5 mcg by mouth 2 (two) times a day (Patient not taking: Reported on 7/24/2024), Disp: , Rfl:     NON FORMULARY, Take 1-2 capsules by mouth daily Pro epa (Patient not taking: Reported on 7/24/2024), Disp: , Rfl:     NON FORMULARY, Take 1 tablet by mouth 2 (two) times a day coleus (Patient not taking: Reported on 7/24/2024), Disp: , Rfl:     Saccharomyces boulardii (Probiotic) 250 MG CAPS, 1 capsule (Patient not taking: Reported on 7/24/2024), Disp: , Rfl:     Tryptophan 500 MG CAPS, Take 1-2 capsules by mouth 3 (three) times a day (Patient not taking: Reported on 7/24/2024), Disp: , Rfl:     Current Allergies     Allergies as of 07/24/2024 - Reviewed 07/24/2024   Allergen Reaction Noted    Atorvastatin Other (See Comments) 07/24/2024    Sulfa antibiotics  06/30/2017    Latex Other (See Comments) and Rash 02/06/2019            The following portions of the patient's history were reviewed and updated as appropriate: allergies, current medications, past family history, past medical history, past social history, past surgical history and problem list.     Past Medical History:   Diagnosis Date    Disease of thyroid gland     Niall's Temperature Syndrome    History of eye surgery     Nonobstructive atherosclerosis of coronary artery        History reviewed. No pertinent surgical history.    History reviewed. No pertinent family history.      Medications  "have been verified.        Objective   /79 (BP Location: Left arm, Patient Position: Sitting, Cuff Size: Standard)   Pulse 62   Temp 98.3 °F (36.8 °C) (Temporal)   Resp 18   Ht 5' 4\" (1.626 m)   Wt 54.5 kg (120 lb 2 oz)   SpO2 98%   BMI 20.62 kg/m²        Physical Exam     Physical Exam  Vitals and nursing note reviewed.   Constitutional:       General: She is awake. She is not in acute distress.     Appearance: Normal appearance. She is well-developed and normal weight.   HENT:      Head: Normocephalic and atraumatic.      Right Ear: Hearing, tympanic membrane, ear canal and external ear normal.      Left Ear: Hearing, tympanic membrane, ear canal and external ear normal.      Nose: Congestion and rhinorrhea present.      Mouth/Throat:      Lips: Pink.      Mouth: Mucous membranes are moist.      Pharynx: Oropharynx is clear. Uvula midline. Posterior oropharyngeal erythema and postnasal drip present. No pharyngeal swelling, oropharyngeal exudate or uvula swelling.      Tonsils: No tonsillar exudate or tonsillar abscesses. 2+ on the right. 2+ on the left.   Eyes:      Conjunctiva/sclera: Conjunctivae normal.   Cardiovascular:      Rate and Rhythm: Normal rate and regular rhythm.      Pulses: Normal pulses.      Heart sounds: Normal heart sounds.   Pulmonary:      Effort: Pulmonary effort is normal.      Breath sounds: Normal breath sounds.   Abdominal:      Tenderness: There is abdominal tenderness in the epigastric area, suprapubic area and left lower quadrant. There is left CVA tenderness. There is no right CVA tenderness, guarding or rebound.       Musculoskeletal:      Cervical back: Full passive range of motion without pain, normal range of motion and neck supple.   Lymphadenopathy:      Cervical: Cervical adenopathy present.   Skin:     General: Skin is warm and dry.   Neurological:      General: No focal deficit present.      Mental Status: She is alert and oriented to person, place, and time. "   Psychiatric:         Mood and Affect: Mood normal.         Behavior: Behavior normal. Behavior is cooperative.         Thought Content: Thought content normal.         Judgment: Judgment normal.

## 2024-07-25 LAB
BACTERIA UR CULT: NORMAL
FLUAV RNA RESP QL NAA+PROBE: NEGATIVE
FLUBV RNA RESP QL NAA+PROBE: NEGATIVE
SARS-COV-2 RNA RESP QL NAA+PROBE: NEGATIVE

## 2024-08-14 ENCOUNTER — OFFICE VISIT (OUTPATIENT)
Dept: URGENT CARE | Facility: CLINIC | Age: 64
End: 2024-08-14
Payer: COMMERCIAL

## 2024-08-14 VITALS
TEMPERATURE: 96.9 F | HEIGHT: 64 IN | RESPIRATION RATE: 18 BRPM | WEIGHT: 119 LBS | OXYGEN SATURATION: 98 % | SYSTOLIC BLOOD PRESSURE: 141 MMHG | BODY MASS INDEX: 20.32 KG/M2 | DIASTOLIC BLOOD PRESSURE: 73 MMHG | HEART RATE: 65 BPM

## 2024-08-14 DIAGNOSIS — R05.1 ACUTE COUGH: Primary | ICD-10-CM

## 2024-08-14 PROCEDURE — 99213 OFFICE O/P EST LOW 20 MIN: CPT | Performed by: FAMILY MEDICINE

## 2024-08-14 PROCEDURE — 87636 SARSCOV2 & INF A&B AMP PRB: CPT | Performed by: FAMILY MEDICINE

## 2024-08-14 NOTE — PROGRESS NOTES
[facility]        NAME: Casi Roberts is a 63 y.o. female  : 1960    MRN: [id number]  DATE: 2024  TIME: 10:56 AM    Assessment and Plan   Acute cough [R05.1]  1. Acute cough  Covid19 and INFLUENZA A/B PCR    Covid19 and INFLUENZA A/B PCR        COVID and flu PCR pending.  May initiate Paxlovid (in her possession from prior treatment course).  If negative, she may stop the Paxlovid.  Advised on hydrating with plenty of water and using Flonase to counteract postnasal drip.    Patient Instructions     Follow up with PCP in 3-5 days.  Proceed to  ER if symptoms worsen.    If tests have been performed at [facility], our office will contact you with results if changes need to be made to the care plan discussed with you at the visit.  You can review your full results on [facility].    Chief Complaint     Chief Complaint   Patient presents with    Cold Like Symptoms     C/O of shortness of breath, dizzy, light headed, sore throat, fatigue and nauseas since Monday and today feeling worst. Cough started today. Covid test perform yesterday Result Negative.         History of Present Illness     63-year-old female presents today with concerns for COVID infection.  Reports symptoms starting about 2 days ago including postnasal drip, coughing, dyspnea, lightheadedness and some headaches.  Denies any obvious sick contacts.  Desires to be tested for COVID-19 via PCR having done a rapid antigen test which was negative.      Review of Systems   Review of Systems   Constitutional:  Negative for chills and fever.   HENT:  Positive for postnasal drip and sore throat.    Respiratory:  Positive for cough and shortness of breath.    Neurological:  Positive for light-headedness and headaches.       Current Medications       Current Outpatient Medications:     cholecalciferol (VITAMIN D3) 400 units tablet, Take 400 Units by mouth, Disp: , Rfl:     diazepam (VALIUM) 2 mg tablet, Take 2 mg by mouth daily  as needed, Disp: , Rfl:     DULoxetine (CYMBALTA) 20 mg capsule, Take 30 mg by mouth daily, Disp: , Rfl:     estradiol (ESTRACE) 0.1 mg/g vaginal cream, Insert into the vagina, Disp: , Rfl:     hydrOXYzine HCL (ATARAX) 25 mg tablet, Take 25 mg by mouth 2 (two) times a day, Disp: , Rfl:     mirtazapine (REMERON) 7.5 MG tablet, take 1 tablet by oral route every day at bedtime as needed, Disp: , Rfl:     Saccharomyces boulardii (Probiotic) 250 MG CAPS, , Disp: , Rfl:     amantadine (SYMMETREL) 100 mg capsule, 1 capsule Every 12 hours (Patient not taking: Reported on 7/24/2024), Disp: , Rfl:     citalopram (CeleXA) 20 mg tablet, every 24 hours (Patient not taking: Reported on 7/24/2024), Disp: , Rfl:     Cyanocobalamin (VITAMIN B 12 PO), Take 1 tablet by mouth daily (Patient not taking: Reported on 7/24/2024), Disp: , Rfl:     Desvenlafaxine Succinate ER 25 MG TB24, Take 25 mg by mouth daily (Patient not taking: Reported on 7/24/2024), Disp: , Rfl:     Glucosamine Sulfate 500 MG TABS, Take 2-3 tablets by mouth daily (Patient not taking: Reported on 7/24/2024), Disp: , Rfl:     liothyronine (CYTOMEL) 5 mcg tablet, Take 7.5 mcg by mouth 2 (two) times a day (Patient not taking: Reported on 7/24/2024), Disp: , Rfl:     NON FORMULARY, Take 1-2 capsules by mouth daily Pro epa (Patient not taking: Reported on 7/24/2024), Disp: , Rfl:     NON FORMULARY, Take 1 tablet by mouth 2 (two) times a day coleus (Patient not taking: Reported on 7/24/2024), Disp: , Rfl:     Tryptophan 500 MG CAPS, Take 1-2 capsules by mouth 3 (three) times a day (Patient not taking: Reported on 7/24/2024), Disp: , Rfl:     Current Allergies     Allergies as of 08/14/2024 - Reviewed 08/14/2024   Allergen Reaction Noted    Atorvastatin Other (See Comments) 07/24/2024    Sulfa antibiotics  06/30/2017    Latex Other (See Comments) and Rash 02/06/2019            The following portions of the patient's history were reviewed and updated as appropriate:  "allergies, current medications, past family history, past medical history, past social history, past surgical history and problem list.     Past Medical History:   Diagnosis Date    Disease of thyroid gland     Niall's Temperature Syndrome    History of eye surgery     Nonobstructive atherosclerosis of coronary artery        History reviewed. No pertinent surgical history.    History reviewed. No pertinent family history.      Medications have been verified.        Objective   /73   Pulse 65   Temp (!) 96.9 °F (36.1 °C)   Resp 18   Ht 5' 4\" (1.626 m)   Wt 54 kg (119 lb)   SpO2 98%   BMI 20.43 kg/m²   No LMP recorded.       Physical Exam     Physical Exam  Vitals and nursing note reviewed.   Constitutional:       General: She is in acute distress.      Appearance: Normal appearance. She is normal weight. She is not ill-appearing, toxic-appearing or diaphoretic.   HENT:      Head: Normocephalic and atraumatic.   Eyes:      General:         Right eye: No discharge.         Left eye: No discharge.      Conjunctiva/sclera: Conjunctivae normal.   Cardiovascular:      Rate and Rhythm: Normal rate and regular rhythm.   Pulmonary:      Effort: Pulmonary effort is normal. No respiratory distress.      Breath sounds: Normal breath sounds. No wheezing, rhonchi or rales.   Skin:     General: Skin is warm.      Findings: No erythema.   Neurological:      General: No focal deficit present.      Mental Status: She is alert and oriented to person, place, and time.   Psychiatric:         Mood and Affect: Mood normal.         Behavior: Behavior normal.         Thought Content: Thought content normal.         Judgment: Judgment normal.                   "

## 2024-08-15 LAB
FLUAV RNA RESP QL NAA+PROBE: NEGATIVE
FLUBV RNA RESP QL NAA+PROBE: NEGATIVE
SARS-COV-2 RNA RESP QL NAA+PROBE: NEGATIVE

## 2024-08-27 ENCOUNTER — OFFICE VISIT (OUTPATIENT)
Dept: URGENT CARE | Facility: CLINIC | Age: 64
End: 2024-08-27
Payer: COMMERCIAL

## 2024-08-27 VITALS
WEIGHT: 120.8 LBS | OXYGEN SATURATION: 98 % | BODY MASS INDEX: 20.62 KG/M2 | RESPIRATION RATE: 18 BRPM | SYSTOLIC BLOOD PRESSURE: 130 MMHG | HEIGHT: 64 IN | HEART RATE: 65 BPM | DIASTOLIC BLOOD PRESSURE: 80 MMHG | TEMPERATURE: 95.6 F

## 2024-08-27 DIAGNOSIS — J06.9 VIRAL UPPER RESPIRATORY TRACT INFECTION: Primary | ICD-10-CM

## 2024-08-27 DIAGNOSIS — R05.1 ACUTE COUGH: ICD-10-CM

## 2024-08-27 PROCEDURE — 87636 SARSCOV2 & INF A&B AMP PRB: CPT | Performed by: PHYSICIAN ASSISTANT

## 2024-08-27 PROCEDURE — 99213 OFFICE O/P EST LOW 20 MIN: CPT | Performed by: PHYSICIAN ASSISTANT

## 2024-08-27 NOTE — PROGRESS NOTES
[facility]        NAME: Casi Roberts is a 63 y.o. female  : 1960    MRN: [id number]  DATE: 2024  TIME: 12:20 PM    Assessment and Plan   Viral upper respiratory tract infection [J06.9]  1. Viral upper respiratory tract infection        2. Acute cough  Covid19 and INFLUENZA A/B PCR        Patient Instructions   Viral upper respiratory infection  Covid flu swab done at pt request  Recommend flonase nasal spray for postnasal drip/cough  Warm salt water gargles  Rest, fluids and supportive care  May benefit from a cool mist humidifier on night stand  Tylenol/ibuprofen as needed for pain/fever    Follow up with PCP in 3-5 days.  Proceed to  ER if symptoms worsen.    If tests have been performed at [facility], our office will contact you with results if changes need to be made to the care plan discussed with you at the visit.  You can review your full results on [facility].    Chief Complaint     Chief Complaint   Patient presents with    Cold Like Symptoms    Generalized Body Aches     Started yesterday with sneezing, rhinorrhea/PND, HA, body aches with chills, dry cough and nausea. States she has GRIMES with wheeze.         History of Present Illness       Casi is a 63-year-old female who presents to clinic complaining of sore throat x 2 days.  She also notes cough, shortness of breath, fatigue, sneezing, left ear pain, nasal congestion, rhinorrhea, and nausea.  She did a COVID at home test it was negative.  She describes the cough is dry nonproductive.  She denies any fever, chills, myalgias, vomiting, diarrhea, loss of taste or smell, recent travel, or any known sick contacts.        Review of Systems   Review of Systems   Constitutional:  Positive for fatigue. Negative for chills and fever.   HENT:  Positive for congestion, ear pain, rhinorrhea, sneezing and sore throat.    Respiratory:  Positive for cough and shortness of breath.    Gastrointestinal:  Positive for nausea.  Negative for diarrhea and vomiting.   Musculoskeletal:  Negative for myalgias.   Neurological:  Negative for headaches.         Current Medications       Current Outpatient Medications:     amantadine (SYMMETREL) 100 mg capsule, 1 capsule 2 (two) times a day as needed, Disp: , Rfl:     cholecalciferol (VITAMIN D3) 400 units tablet, Take 400 Units by mouth, Disp: , Rfl:     diazepam (VALIUM) 2 mg tablet, Take 2 mg by mouth daily as needed, Disp: , Rfl:     DULoxetine (CYMBALTA) 20 mg capsule, Take 30 mg by mouth daily, Disp: , Rfl:     estradiol (ESTRACE) 0.1 mg/g vaginal cream, Insert into the vagina, Disp: , Rfl:     hydrOXYzine HCL (ATARAX) 25 mg tablet, Take 25 mg by mouth 2 (two) times a day, Disp: , Rfl:     mirtazapine (REMERON) 7.5 MG tablet, take 1 tablet by oral route every day at bedtime as needed, Disp: , Rfl:     Saccharomyces boulardii (Probiotic) 250 MG CAPS, daily as needed, Disp: , Rfl:     Desvenlafaxine Succinate ER 25 MG TB24, Take 25 mg by mouth daily (Patient not taking: Reported on 7/24/2024), Disp: , Rfl:     Glucosamine Sulfate 500 MG TABS, Take 2-3 tablets by mouth daily (Patient not taking: Reported on 7/24/2024), Disp: , Rfl:     Current Allergies     Allergies as of 08/27/2024 - Reviewed 08/27/2024   Allergen Reaction Noted    Atorvastatin Other (See Comments) 07/24/2024    Sulfa antibiotics  06/30/2017    Latex Other (See Comments) and Rash 02/06/2019            The following portions of the patient's history were reviewed and updated as appropriate: allergies, current medications, past family history, past medical history, past social history, past surgical history and problem list.     Past Medical History:   Diagnosis Date    Allergic rhinitis     Anxiety     Depression     Disease of thyroid gland     Niall's Temperature Syndrome    History of eye surgery     Nonobstructive atherosclerosis of coronary artery        Past Surgical History:   Procedure Laterality Date    EYE SURGERY  "Left     lazy eye repair    PATELLA FRACTURE SURGERY      SALIVARY GLAND SURGERY         History reviewed. No pertinent family history.      Medications have been verified.        Objective   /80   Pulse 65   Temp (!) 95.6 °F (35.3 °C)   Resp 18   Ht 5' 4\" (1.626 m)   Wt 54.8 kg (120 lb 12.8 oz)   SpO2 98%   BMI 20.74 kg/m²   No LMP recorded. Patient is postmenopausal.       Physical Exam     Physical Exam  Vitals and nursing note reviewed.   Constitutional:       General: She is not in acute distress.     Appearance: Normal appearance. She is not ill-appearing.   HENT:      Right Ear: Tympanic membrane, ear canal and external ear normal.      Left Ear: Tympanic membrane, ear canal and external ear normal.      Nose: Congestion present.      Mouth/Throat:      Mouth: Mucous membranes are moist.      Pharynx: No oropharyngeal exudate or posterior oropharyngeal erythema.   Cardiovascular:      Rate and Rhythm: Normal rate and regular rhythm.      Heart sounds: Normal heart sounds.   Pulmonary:      Effort: Pulmonary effort is normal. No respiratory distress.      Breath sounds: Normal breath sounds. No stridor. No wheezing, rhonchi or rales.   Lymphadenopathy:      Cervical: No cervical adenopathy.   Neurological:      Mental Status: She is alert and oriented to person, place, and time.   Psychiatric:         Mood and Affect: Mood normal.         Behavior: Behavior normal.                   "

## 2024-10-10 ENCOUNTER — OFFICE VISIT (OUTPATIENT)
Dept: URGENT CARE | Facility: CLINIC | Age: 64
End: 2024-10-10
Payer: COMMERCIAL

## 2024-10-10 VITALS
SYSTOLIC BLOOD PRESSURE: 158 MMHG | RESPIRATION RATE: 18 BRPM | TEMPERATURE: 96.9 F | OXYGEN SATURATION: 98 % | HEIGHT: 64 IN | HEART RATE: 67 BPM | DIASTOLIC BLOOD PRESSURE: 88 MMHG | BODY MASS INDEX: 20.74 KG/M2

## 2024-10-10 DIAGNOSIS — R07.89 SENSATION OF CHEST TIGHTNESS: Primary | ICD-10-CM

## 2024-10-10 PROCEDURE — 99213 OFFICE O/P EST LOW 20 MIN: CPT | Performed by: FAMILY MEDICINE

## 2024-10-10 NOTE — PROGRESS NOTES
[facility]        NAME: Casi Roberts is a 64 y.o. female  : 1960    MRN: [id number]  DATE: October 10, 2024  TIME: 7:31 PM    Assessment and Plan   Sensation of chest tightness [R07.89]  1. Sensation of chest tightness  ECG 12 lead        ECG normal sinus rhythm.  EMR review reveals recent episodes of hyponatremia to 129 during her ED evaluations x 2.  Current experience likely secondary to the same cause though unable to confirm with blood work.  Patient advised on taking more salt.  Patient was agreeable with this.    Patient Instructions     Follow up with PCP in 3-5 days.  Proceed to  ER if symptoms worsen.    If tests have been performed at [facility], our office will contact you with results if changes need to be made to the care plan discussed with you at the visit.  [facility]    Chief Complaint     Chief Complaint   Patient presents with    Chest Pain     C/O of chest tightness, feeling sensation in the extremities (legs and arm feels weak) feels disoriented and blurry vision.         History of Present Illness       64-year-old female presents today with chest tightness associated with extremity weakness and lightheadedness which started about 1 hour ago while having a Zoom call.  Initiated with blurry vision.  Try to see if the symptoms resolve on their own and after about 45 minutes decided to come in for evaluation.  Of note, have been evaluated in the ED twice due to somewhat similar presentations back in May and .    Chest Pain   Associated symptoms include weakness. Pertinent negatives include no abdominal pain, cough, fever, palpitations or shortness of breath.       Review of Systems   Review of Systems   Constitutional:  Negative for chills and fever.   Respiratory:  Negative for cough and shortness of breath.    Cardiovascular:  Positive for chest pain. Negative for palpitations.   Gastrointestinal:  Negative for abdominal pain.    Neurological:  Positive for weakness and light-headedness. Negative for syncope and speech difficulty.     Current Medications       Current Outpatient Medications:     amantadine (SYMMETREL) 100 mg capsule, 1 capsule 2 (two) times a day as needed, Disp: , Rfl:     cholecalciferol (VITAMIN D3) 400 units tablet, Take 400 Units by mouth, Disp: , Rfl:     Desvenlafaxine Succinate ER 25 MG TB24, Take 25 mg by mouth daily, Disp: , Rfl:     diazepam (VALIUM) 2 mg tablet, Take 2 mg by mouth daily as needed, Disp: , Rfl:     DULoxetine (CYMBALTA) 20 mg capsule, Take 30 mg by mouth daily, Disp: , Rfl:     estradiol (ESTRACE) 0.1 mg/g vaginal cream, Insert into the vagina, Disp: , Rfl:     Glucosamine Sulfate 500 MG TABS, Take 2-3 tablets by mouth daily, Disp: , Rfl:     hydrOXYzine HCL (ATARAX) 25 mg tablet, Take 25 mg by mouth 2 (two) times a day, Disp: , Rfl:     mirtazapine (REMERON) 7.5 MG tablet, take 1 tablet by oral route every day at bedtime as needed, Disp: , Rfl:     Saccharomyces boulardii (Probiotic) 250 MG CAPS, daily as needed, Disp: , Rfl:     Current Allergies     Allergies as of 10/10/2024 - Reviewed 10/10/2024   Allergen Reaction Noted    Atorvastatin Other (See Comments) 07/24/2024    Sulfa antibiotics  06/30/2017    Latex Other (See Comments) and Rash 02/06/2019            The following portions of the patient's history were reviewed and updated as appropriate: allergies, current medications, past family history, past medical history, past social history, past surgical history and problem list.     Past Medical History:   Diagnosis Date    Allergic rhinitis     Anxiety     Depression     Disease of thyroid gland     Niall's Temperature Syndrome    History of eye surgery     Nonobstructive atherosclerosis of coronary artery        Past Surgical History:   Procedure Laterality Date    EYE SURGERY Left     lazy eye repair    PATELLA FRACTURE SURGERY      SALIVARY GLAND SURGERY         No family history on  "file.      Medications have been verified.        Objective   /88   Pulse 67   Temp (!) 96.9 °F (36.1 °C)   Resp 18   Ht 5' 4\" (1.626 m)   SpO2 98%   BMI 20.74 kg/m²   No LMP recorded. Patient is postmenopausal.       Physical Exam     Physical Exam  Vitals and nursing note reviewed.   Constitutional:       General: She is in acute distress.      Appearance: Normal appearance. She is well-developed and normal weight. She is not ill-appearing, toxic-appearing or diaphoretic.   HENT:      Head: Normocephalic and atraumatic.   Eyes:      General:         Right eye: No discharge.         Left eye: No discharge.      Conjunctiva/sclera: Conjunctivae normal.   Cardiovascular:      Rate and Rhythm: Normal rate and regular rhythm. No extrasystoles are present.     Heart sounds: Normal heart sounds.      Comments: No carotid bruits  Pulmonary:      Effort: Pulmonary effort is normal. No accessory muscle usage or respiratory distress.      Breath sounds: Normal breath sounds. No decreased breath sounds, wheezing or rhonchi.   Abdominal:      General: Abdomen is flat.   Skin:     General: Skin is warm.      Findings: No erythema.   Neurological:      General: No focal deficit present.      Mental Status: She is alert and oriented to person, place, and time.      Motor: Weakness present.   Psychiatric:         Mood and Affect: Mood normal. Mood is not anxious.         Behavior: Behavior normal. Behavior is not agitated.         Thought Content: Thought content normal.         Judgment: Judgment normal.                   "

## 2024-10-11 LAB
ATRIAL RATE: 62 BPM
P AXIS: 55 DEGREES
PR INTERVAL: 152 MS
QRS AXIS: 57 DEGREES
QRSD INTERVAL: 88 MS
QT INTERVAL: 416 MS
QTC INTERVAL: 422 MS
T WAVE AXIS: 56 DEGREES
VENTRICULAR RATE: 62 BPM

## 2024-10-11 PROCEDURE — 93010 ELECTROCARDIOGRAM REPORT: CPT | Performed by: INTERNAL MEDICINE
